# Patient Record
Sex: MALE
[De-identification: names, ages, dates, MRNs, and addresses within clinical notes are randomized per-mention and may not be internally consistent; named-entity substitution may affect disease eponyms.]

---

## 2021-11-19 ENCOUNTER — HOSPITAL ENCOUNTER (OUTPATIENT)
Dept: HOSPITAL 33 - SDC | Age: 51
Discharge: HOME | End: 2021-11-19
Attending: FAMILY MEDICINE
Payer: COMMERCIAL

## 2021-11-19 DIAGNOSIS — K57.30: Primary | ICD-10-CM

## 2021-11-19 NOTE — OP
SURGERY DATE/TIME:  11/19/2021   0733    



PREOPERATIVE DIAGNOSIS:    Screening exam.  



POSTOPERATIVE DIAGNOSIS:    Diverticulosis on the right and left side of the colon 
otherwise normal colon.  



PROCEDURE:    Colonoscopy.



SURGEON:        Dr. Vilchis.



ANESTHESIA:    MAC. Medications given by anesthesia department. 



HISTORY:  The patient is a 51-year-old presenting now for his first screening colonoscopy. 
He was appraised of the risks of the procedure including the risk of perforation, 
phlebitis, untoward reaction to medication, bleeding and missed lesions. The patient 
verbalized his understanding and desired to have the procedure performed.



DESCRIPTION OF PROCEDURE:  The patient was given the medications by the anesthesia 
department. He had continuous pulse oximetry, ECG monitoring, intermittent blood pressure 
monitoring and tidal CO2 monitoring during the examination. He was placed in the left 
lateral decubitus position. A digital rectal examination was performed and revealed normal 
anal sphincter tone, no masses. The flexible Olympus pediatric colonoscope was used to 
intubate the rectum. A view of the colon was developed sequentially to the cecum. There 
was noted to be moderate diverticulosis in the left and also the right side of the colon. 
No other mucosal lesions were encountered. The scope was removed from the patient who 
tolerated the procedure well and was sent back to OP recovery in good condition. The prep 
was noted to be fair with some semisolid stool and solid stool in certain areas of the 
colon.

## 2021-11-22 VITALS — OXYGEN SATURATION: 98 % | DIASTOLIC BLOOD PRESSURE: 82 MMHG | SYSTOLIC BLOOD PRESSURE: 119 MMHG | HEART RATE: 60 BPM
